# Patient Record
Sex: MALE | Race: WHITE | Employment: FULL TIME | URBAN - METROPOLITAN AREA
[De-identification: names, ages, dates, MRNs, and addresses within clinical notes are randomized per-mention and may not be internally consistent; named-entity substitution may affect disease eponyms.]

---

## 2017-11-27 ENCOUNTER — OFFICE VISIT (OUTPATIENT)
Dept: INTERNAL MEDICINE CLINIC | Age: 32
End: 2017-11-27

## 2017-11-27 VITALS
DIASTOLIC BLOOD PRESSURE: 66 MMHG | HEIGHT: 74 IN | OXYGEN SATURATION: 98 % | BODY MASS INDEX: 26.05 KG/M2 | SYSTOLIC BLOOD PRESSURE: 122 MMHG | WEIGHT: 203 LBS | HEART RATE: 70 BPM

## 2017-11-27 DIAGNOSIS — B36.0 TINEA VERSICOLOR: ICD-10-CM

## 2017-11-27 DIAGNOSIS — Z56.6 WORK STRESS: ICD-10-CM

## 2017-11-27 DIAGNOSIS — Z00.00 ENCOUNTER FOR WELL ADULT EXAM WITHOUT ABNORMAL FINDINGS: Primary | ICD-10-CM

## 2017-11-27 PROCEDURE — 99395 PREV VISIT EST AGE 18-39: CPT | Performed by: INTERNAL MEDICINE

## 2017-11-27 RX ORDER — KETOCONAZOLE 20 MG/G
CREAM TOPICAL 2 TIMES DAILY
Qty: 60 G | Refills: 1 | Status: SHIPPED | OUTPATIENT
Start: 2017-11-27 | End: 2018-11-28

## 2017-11-27 SDOH — HEALTH STABILITY - MENTAL HEALTH: OTHER PHYSICAL AND MENTAL STRAIN RELATED TO WORK: Z56.6

## 2017-11-27 NOTE — PROGRESS NOTES
 Clindamycin/Lincomycin     Ceclor [Cefaclor] Rash        Review of Systems:    Review of Systems   Constitutional: Negative for fatigue and fever. HENT: Negative for ear pain, hearing loss, postnasal drip, rhinorrhea, sinus pressure, sore throat and tinnitus. Eyes: Negative for redness. Respiratory: Negative for cough, chest tightness, shortness of breath and wheezing. Cardiovascular: Negative for chest pain, palpitations and leg swelling. Gastrointestinal: Negative for abdominal pain, constipation, diarrhea, nausea and vomiting. Genitourinary: Negative for dysuria and frequency. Musculoskeletal: Negative for arthralgias, back pain and joint swelling. Skin: Negative for rash. Neurological: Negative for dizziness, syncope and headaches. Psychiatric/Behavioral: Positive for sleep disturbance. The patient is nervous/anxious. Objective:    Vitals:    11/27/17 1127   BP: 122/66   Pulse: 70   SpO2: 98%   Weight: 203 lb (92.1 kg)   Height: 6' 2\" (1.88 m)     Wt Readings from Last 3 Encounters:   11/27/17 203 lb (92.1 kg)   10/26/16 213 lb (96.6 kg)   01/08/16 214 lb (97.1 kg)       Body mass index is 26.06 kg/m². Physical Exam   Constitutional: He appears well-developed and well-nourished. No distress. HENT:   Head: Normocephalic and atraumatic. Right Ear: Hearing, tympanic membrane, external ear and ear canal normal.   Left Ear: Hearing, tympanic membrane, external ear and ear canal normal.   Nose: Nose normal. No mucosal edema or rhinorrhea. Mouth/Throat: Oropharynx is clear and moist and mucous membranes are normal.   Eyes: Pupils are equal, round, and reactive to light. No scleral icterus. Neck: No thyroid mass and no thyromegaly present. Cardiovascular: Normal rate, regular rhythm, S1 normal, S2 normal and normal heart sounds. No murmur heard. Pulmonary/Chest: Effort normal and breath sounds normal. He has no decreased breath sounds. He has no wheezes.  He has no rhonchi. He has no rales. Abdominal: Soft. Normal appearance and bowel sounds are normal. There is no tenderness. Lymphadenopathy:     He has no cervical adenopathy. Neurological: He is alert. No cranial nerve deficit or sensory deficit. Gait normal.   Skin: Skin is warm and dry. Rash (Hyperpigmented annular patches on upper chest) noted. Assessment and Plan    1. Encounter for well adult exam without abnormal findings  Patient is up-to-date on well care. Declined vaccines however. 2. Work stress  Reviewed ways to relieve stress including deep breathing exercises, exercise, getting adequate sleep. If stress becomes overwhelming he should return to discuss other treatment options    3. Tinea versicolor    - ketoconazole (NIZORAL) 2 % cream; Apply topically 2 times daily  Dispense: 60 g; Refill: 1     Patient Instructions   Works stress  Start exercising. The goal is 150 minutes a week  Do deep breathing exercises at least once a day  Consider journaling    Tinea versicolor  Apply ketoconazole cream twice a day for 2-3 weeks         Return in about 1 year (around 11/27/2018) for Return sooner if stress becomes difficult to manage.        Kelsey Medina

## 2017-11-28 ASSESSMENT — ENCOUNTER SYMPTOMS
DIARRHEA: 0
CHEST TIGHTNESS: 0
VOMITING: 0
SORE THROAT: 0
SINUS PRESSURE: 0
EYE REDNESS: 0
NAUSEA: 0
SHORTNESS OF BREATH: 0
ABDOMINAL PAIN: 0
WHEEZING: 0
COUGH: 0
BACK PAIN: 0
CONSTIPATION: 0
RHINORRHEA: 0

## 2018-04-02 ENCOUNTER — TELEPHONE (OUTPATIENT)
Dept: INTERNAL MEDICINE CLINIC | Age: 33
End: 2018-04-02

## 2018-04-16 ENCOUNTER — OFFICE VISIT (OUTPATIENT)
Dept: INTERNAL MEDICINE CLINIC | Age: 33
End: 2018-04-16

## 2018-04-16 VITALS
HEART RATE: 69 BPM | OXYGEN SATURATION: 98 % | BODY MASS INDEX: 25.37 KG/M2 | SYSTOLIC BLOOD PRESSURE: 126 MMHG | WEIGHT: 203 LBS | DIASTOLIC BLOOD PRESSURE: 74 MMHG

## 2018-04-16 DIAGNOSIS — R13.12 OROPHARYNGEAL DYSPHAGIA: Primary | ICD-10-CM

## 2018-04-16 PROCEDURE — 99214 OFFICE O/P EST MOD 30 MIN: CPT | Performed by: INTERNAL MEDICINE

## 2018-04-18 ASSESSMENT — ENCOUNTER SYMPTOMS
EYE REDNESS: 0
EYE PAIN: 0
COUGH: 0
CHOKING: 0

## 2018-11-28 ENCOUNTER — OFFICE VISIT (OUTPATIENT)
Dept: INTERNAL MEDICINE CLINIC | Age: 33
End: 2018-11-28
Payer: COMMERCIAL

## 2018-11-28 VITALS
HEIGHT: 74 IN | HEART RATE: 65 BPM | WEIGHT: 211 LBS | OXYGEN SATURATION: 98 % | SYSTOLIC BLOOD PRESSURE: 118 MMHG | DIASTOLIC BLOOD PRESSURE: 74 MMHG | BODY MASS INDEX: 27.08 KG/M2

## 2018-11-28 DIAGNOSIS — Z00.00 WELL ADULT EXAM: Primary | ICD-10-CM

## 2018-11-28 DIAGNOSIS — G47.30 OBSERVED SLEEP APNEA: ICD-10-CM

## 2018-11-28 DIAGNOSIS — E78.6 LOW HDL (UNDER 40): ICD-10-CM

## 2018-11-28 PROCEDURE — 99395 PREV VISIT EST AGE 18-39: CPT | Performed by: INTERNAL MEDICINE

## 2018-11-28 RX ORDER — CHLORAL HYDRATE 500 MG
3000 CAPSULE ORAL 2 TIMES DAILY
Qty: 60 CAPSULE | Refills: 5 | Status: SHIPPED | OUTPATIENT
Start: 2018-11-28 | End: 2020-08-12

## 2018-11-28 ASSESSMENT — ENCOUNTER SYMPTOMS
FACIAL SWELLING: 0
COUGH: 0
CHOKING: 0
EYE REDNESS: 0
EYE PAIN: 0

## 2018-11-28 NOTE — PATIENT INSTRUCTIONS
information. Patient Education        Therapeutic Ball: Back Exercises  Your Care Instructions  Here are some examples of typical exercises for your condition. Start each exercise slowly. Ease off the exercise if you start to have pain. Your doctor or physical therapist will tell you when you can start these exercises and which ones will work best for you. To prepare, make sure that your ball is the right size for you. When inflated and firm, it should allow you to sit with your hips and knees bent at about a 90-degree angle (like the letter L). How to do the exercises  Seated position on ball    5. Use this exercise to get used to moving on the ball and to find your best sitting position. 6. Sit comfortably on the ball with your feet about hip-width apart. If you feel unsteady, rest your hands on the ball near your hips. 7. As you do this exercise, try to keep your shoulders and upper body relaxed and still. 8. Using your stomach and back muscles to move your pelvis, roll the ball forward. This will round your back. 9. Still using your stomach and back muscles, roll the ball back. You will arch your back. 10. Repeat this rounding-arching motion a few times. 11. Stop in between the two positions, where your back is not rounded or arched. This is called your neutral position. Pelvic rotation    8. Sit tall on the ball. 9. Slowly rotate your hips in a [place] pattern. Keep the movement focused at your hips. 10. Repeat, but [place] in the other direction. 11. Repeat 8 to 12 times. Postural sitting    7. Use this position to find a stable, relaxed posture on the ball. You can use this position as your starting point for other ball exercises. If you feel unsteady on the ball, start on a chair first.  8. Sit on a ball or chair, with your feet planted straight in front of you. 9. Imagine that a string at the top of your head is pulling you straight up.  Think of yourself as 2 inches taller than you straight line. 11. Try to keep the ball steady. Hold for about 6 seconds as you continue to breathe normally. 12. Slowly lower your hips back down to the floor. 13. Repeat 8 to 12 times. Ball curls with bridge    5. Start flat on your back with your ankles resting on the ball. 6. Look up at the ceiling, and keep your chin relaxed. You can place a small pillow under your head or neck for comfort. 7. With your arms by your side, press your hands onto the floor for stability. 8. Tighten your belly muscles by pulling in your belly button toward your spine. 9. Push your heels down toward the floor, squeeze your buttocks, and lift your hips off the floor until your shoulders, hips, and knees are all in a straight line. 10. While holding the bridge position, roll the ball toward you with your heels. Keep your hips as level as you can. 11. Pause briefly, and then roll the ball back out. Try to keep the ball rolling straight. You will feel the muscles in your lower belly working as you straighten your legs. 12. Lower your hips, and return to your starting position. 13. Repeat 8 to 12 times. 14. When you can keep your body and the ball steady throughout this exercise, you're ready for more challenge. Try keeping your hips raised while rolling the ball out, holding the bridge, and rolling back, a few times in a row. Praying jenae    6. Kneel upright with the ball in front of you. 7. To start, clasp your hands together. Rest them on the ball in front of you. 8. As you do this exercise, keep your back and hips straight and tighten your belly and buttocks muscles. Keep your knees in place. 9. Press on the ball with your arms. Lean forward from the knees. This rolls the ball forward. You will bear most of your weight on your arms. 10. If your back starts to ache, you've gone too far. Pull back a bit. 11. Roll back to the start position. 12. Repeat 8 to 12 times. Walk-out plank on ball    1.  Kneel over

## 2018-11-28 NOTE — PROGRESS NOTES
Administered Date(s) Administered    Tdap (Boostrix, Adacel) 07/01/2011       Health Maintenance   Topic Date Due    Flu vaccine (1) 12/06/2019 (Originally 9/1/2018)    HIV screen  12/23/2022 (Originally 5/9/2000)    DTaP/Tdap/Td vaccine (2 - Td) 07/01/2021       ASSESSMENT/PLAN:  1. Well adult exam  -continue to exercise  -  Continue to wear seat belt when in the car  -      2. Low HDL (under 40)  worsening  - Omega-3 Fatty Acids (FISH OIL) 1000 MG CAPS; Take 3 capsules by mouth 2 times daily  Dispense: 60 capsule; Refill: 5    3. Observed sleep apnea  new  - Cara Baxter MD, Sleep Medicine, [facility]      Return in about 4 months (around 3/28/2019) for low hdl - fasting. An electronic signature was used to authenticate this note.     --Tarun Beasley MD on 11/29/2018 at 9:28 AM

## 2019-08-01 ENCOUNTER — OFFICE VISIT (OUTPATIENT)
Dept: INTERNAL MEDICINE CLINIC | Age: 34
End: 2019-08-01
Payer: COMMERCIAL

## 2019-08-01 VITALS
OXYGEN SATURATION: 98 % | DIASTOLIC BLOOD PRESSURE: 70 MMHG | WEIGHT: 216 LBS | HEART RATE: 85 BPM | SYSTOLIC BLOOD PRESSURE: 128 MMHG | TEMPERATURE: 98.4 F | BODY MASS INDEX: 27.73 KG/M2

## 2019-08-01 DIAGNOSIS — A09 TRAVELER'S DIARRHEA: Primary | ICD-10-CM

## 2019-08-01 PROCEDURE — 99213 OFFICE O/P EST LOW 20 MIN: CPT | Performed by: INTERNAL MEDICINE

## 2019-08-01 RX ORDER — AZITHROMYCIN 500 MG/1
500 TABLET, FILM COATED ORAL DAILY
Qty: 3 TABLET | Refills: 0 | Status: SHIPPED | OUTPATIENT
Start: 2019-08-01 | End: 2019-08-04

## 2019-08-01 ASSESSMENT — PATIENT HEALTH QUESTIONNAIRE - PHQ9
SUM OF ALL RESPONSES TO PHQ9 QUESTIONS 1 & 2: 0
SUM OF ALL RESPONSES TO PHQ QUESTIONS 1-9: 0
2. FEELING DOWN, DEPRESSED OR HOPELESS: 0
SUM OF ALL RESPONSES TO PHQ QUESTIONS 1-9: 0
1. LITTLE INTEREST OR PLEASURE IN DOING THINGS: 0

## 2019-10-17 ENCOUNTER — OFFICE VISIT (OUTPATIENT)
Dept: INTERNAL MEDICINE CLINIC | Age: 34
End: 2019-10-17
Payer: COMMERCIAL

## 2019-10-17 VITALS
WEIGHT: 215 LBS | DIASTOLIC BLOOD PRESSURE: 70 MMHG | BODY MASS INDEX: 27.59 KG/M2 | SYSTOLIC BLOOD PRESSURE: 120 MMHG | HEIGHT: 74 IN | HEART RATE: 60 BPM | OXYGEN SATURATION: 97 %

## 2019-10-17 DIAGNOSIS — Z23 NEED FOR INFLUENZA VACCINATION: ICD-10-CM

## 2019-10-17 DIAGNOSIS — Z00.00 WELL ADULT EXAM: Primary | ICD-10-CM

## 2019-10-17 DIAGNOSIS — L29.0 ANAL ITCHING: ICD-10-CM

## 2019-10-17 PROCEDURE — 99395 PREV VISIT EST AGE 18-39: CPT | Performed by: INTERNAL MEDICINE

## 2019-10-17 PROCEDURE — 90686 IIV4 VACC NO PRSV 0.5 ML IM: CPT | Performed by: INTERNAL MEDICINE

## 2019-10-17 PROCEDURE — 90471 IMMUNIZATION ADMIN: CPT | Performed by: INTERNAL MEDICINE

## 2019-10-17 ASSESSMENT — ENCOUNTER SYMPTOMS
ALLERGIC/IMMUNOLOGIC NEGATIVE: 1
GASTROINTESTINAL NEGATIVE: 1
RESPIRATORY NEGATIVE: 1
EYES NEGATIVE: 1

## 2019-10-17 ASSESSMENT — SLEEP AND FATIGUE QUESTIONNAIRES
HOW LIKELY ARE YOU TO NOD OFF OR FALL ASLEEP WHILE SITTING QUIETLY AFTER LUNCH WITHOUT ALCOHOL: 0
HOW LIKELY ARE YOU TO NOD OFF OR FALL ASLEEP IN A CAR, WHILE STOPPED FOR A FEW MINUTES IN TRAFFIC: 0
HOW LIKELY ARE YOU TO NOD OFF OR FALL ASLEEP WHILE SITTING AND READING: 1
ESS TOTAL SCORE: 3
HOW LIKELY ARE YOU TO NOD OFF OR FALL ASLEEP WHEN YOU ARE A PASSENGER IN A CAR FOR AN HOUR WITHOUT A BREAK: 0
HOW LIKELY ARE YOU TO NOD OFF OR FALL ASLEEP WHILE SITTING INACTIVE IN A PUBLIC PLACE: 0
HOW LIKELY ARE YOU TO NOD OFF OR FALL ASLEEP WHILE LYING DOWN TO REST IN THE AFTERNOON WHEN CIRCUMSTANCES PERMIT: 2
HOW LIKELY ARE YOU TO NOD OFF OR FALL ASLEEP WHILE SITTING AND TALKING TO SOMEONE: 0
HOW LIKELY ARE YOU TO NOD OFF OR FALL ASLEEP WHILE WATCHING TV: 0

## 2019-10-22 LAB
ALBUMIN SERPL-MCNC: 4.8 G/DL
ALP BLD-CCNC: 63 U/L
ALT SERPL-CCNC: 27 U/L
ANION GAP SERPL CALCULATED.3IONS-SCNC: NORMAL MMOL/L
AST SERPL-CCNC: 16 U/L
AVERAGE GLUCOSE: NORMAL
BASOPHILS ABSOLUTE: 0 /ΜL
BASOPHILS RELATIVE PERCENT: NORMAL
BILIRUB SERPL-MCNC: 0.5 MG/DL (ref 0.1–1.4)
BUN BLDV-MCNC: 14 MG/DL
CALCIUM SERPL-MCNC: 3.7 MG/DL
CHLORIDE BLD-SCNC: 102 MMOL/L
CHOLESTEROL, TOTAL: 176 MG/DL
CHOLESTEROL, TOTAL: 176 MG/DL
CHOLESTEROL/HDL RATIO: 4.5
CO2: NORMAL
CREAT SERPL-MCNC: 1.12 MG/DL
EOSINOPHILS ABSOLUTE: 9 /ΜL
EOSINOPHILS RELATIVE PERCENT: NORMAL
GFR CALCULATED: 85
GLUCOSE BLD-MCNC: 95 MG/DL
HBA1C MFR BLD: 5.3 %
HCT VFR BLD CALC: 47.6 % (ref 41–53)
HDLC SERPL-MCNC: 39 MG/DL (ref 35–70)
HEMOGLOBIN: 15.9 G/DL (ref 13.5–17.5)
LDL CHOLESTEROL CALCULATED: 115 MG/DL (ref 0–160)
LYMPHOCYTES ABSOLUTE: 36 /ΜL
LYMPHOCYTES RELATIVE PERCENT: NORMAL
MCH RBC QN AUTO: 29.4 PG
MCHC RBC AUTO-ENTMCNC: 33.4 G/DL
MCV RBC AUTO: 88 FL
MONOCYTES ABSOLUTE: 9 /ΜL
MONOCYTES RELATIVE PERCENT: NORMAL
NEUTROPHILS ABSOLUTE: 45 /ΜL
NEUTROPHILS RELATIVE PERCENT: NORMAL
PDW BLD-RTO: NORMAL %
PLATELET # BLD: 285 K/ΜL
PMV BLD AUTO: NORMAL FL
POTASSIUM SERPL-SCNC: 4.5 MMOL/L
RBC # BLD: 5.41 10^6/ΜL
SODIUM BLD-SCNC: 142 MMOL/L
TOTAL PROTEIN: 7.1
TRIGL SERPL-MCNC: 112 MG/DL
VLDLC SERPL CALC-MCNC: 22 MG/DL
WBC # BLD: 5.3 10^3/ML

## 2019-10-30 ENCOUNTER — PATIENT MESSAGE (OUTPATIENT)
Dept: INTERNAL MEDICINE CLINIC | Age: 34
End: 2019-10-30

## 2020-03-31 ENCOUNTER — OFFICE VISIT (OUTPATIENT)
Dept: INTERNAL MEDICINE CLINIC | Age: 35
End: 2020-03-31
Payer: COMMERCIAL

## 2020-03-31 VITALS
SYSTOLIC BLOOD PRESSURE: 114 MMHG | DIASTOLIC BLOOD PRESSURE: 70 MMHG | OXYGEN SATURATION: 98 % | BODY MASS INDEX: 27.6 KG/M2 | WEIGHT: 215 LBS | HEART RATE: 74 BPM

## 2020-03-31 PROCEDURE — 99213 OFFICE O/P EST LOW 20 MIN: CPT | Performed by: INTERNAL MEDICINE

## 2020-03-31 RX ORDER — IBUPROFEN 800 MG/1
800 TABLET ORAL EVERY 8 HOURS PRN
Qty: 90 TABLET | Refills: 2 | Status: SHIPPED | OUTPATIENT
Start: 2020-03-31 | End: 2021-08-29 | Stop reason: ALTCHOICE

## 2020-03-31 ASSESSMENT — PATIENT HEALTH QUESTIONNAIRE - PHQ9
SUM OF ALL RESPONSES TO PHQ QUESTIONS 1-9: 0
SUM OF ALL RESPONSES TO PHQ9 QUESTIONS 1 & 2: 0
2. FEELING DOWN, DEPRESSED OR HOPELESS: 0
SUM OF ALL RESPONSES TO PHQ QUESTIONS 1-9: 0
1. LITTLE INTEREST OR PLEASURE IN DOING THINGS: 0

## 2020-03-31 NOTE — PROGRESS NOTES
[address]   Phone: [phone]           Patient Name: Sixto Dubon    YOB: 1985    Today's Date: 3/31/20           Chief Complaint   Patient presents with    Leg Pain     right side/ whole leg/ not a constant pain          Subjective:  Two weeks of right leg pain  Started to work outside/painting  Has not gone away  Hurts in isolated spots on his hip, calf, knee  Itchy/warm/dull  He has a mole on his leg for years  Sometimes has low back pain. Years ago he saw a chiropractor and was told he had a slight curve. Has not tried any medicines       History:     Past Medical History:   Diagnosis Date    Cyst     left side     Hand injury     left thumb tend rupture    Hernia     age 1       Current Outpatient Medications on File Prior to Visit   Medication Sig Dispense Refill    Probiotic Product (PROBIOTIC PO) Take by mouth      Omega-3 Fatty Acids (FISH OIL) 1000 MG CAPS Take 3 capsules by mouth 2 times daily 60 capsule 5     No current facility-administered medications on file prior to visit. Social History     Tobacco Use    Smoking status: Never Smoker    Smokeless tobacco: Never Used   Substance Use Topics    Alcohol use: Yes     Comment: joan         Review of Systems:    Review of Systems    Objective:    Vitals:    03/31/20 1227   BP: 114/70   Pulse: 74   SpO2: 98%   Weight: 215 lb (97.5 kg)     Wt Readings from Last 3 Encounters:   03/31/20 215 lb (97.5 kg)   10/17/19 215 lb (97.5 kg)   08/01/19 216 lb (98 kg)       Body mass index is 27.6 kg/m². Physical Exam  Constitutional:       Appearance: Normal appearance. He is not ill-appearing. Musculoskeletal:      Right hip: Normal.      Left hip: Normal.      Cervical back: Normal.      Thoracic back: Normal.      Lumbar back: Normal.      Comments: Negative straight leg   Skin:         Neurological:      Mental Status: He is alert. Psychiatric:         Mood and Affect: Mood normal.         Behavior: Behavior normal.         Thought Content: Thought content normal.         Judgment: Judgment normal.           Assessment:    1. Skin lesion  Skin lesion appears to be a scar, however he has a lot of moles. Recommend dermatology referral.    - External Referral To Dermatology  - ibuprofen (ADVIL;MOTRIN) 800 MG tablet; Take 1 tablet by mouth every 8 hours as needed for Pain  Dispense: 90 tablet; Refill: 2    2. Right leg pain  Exam reassuring. Take ibuprofen as needed   - ibuprofen (ADVIL;MOTRIN) 800 MG tablet; Take 1 tablet by mouth every 8 hours as needed for Pain  Dispense: 90 tablet; Refill: 2        Plan/Patient Instructions:    Patient Instructions   [facility]  [phone]    Take Ibuprofen as needed for pain        Return in about 7 months (around 10/15/2020) for Well Adult Exam .       [address]       Documentation was done using voice recognition dragon software. Every effort was made to ensure accuracy; however, inadvertent, unintentional computerized transcription errors may be present.

## 2020-06-11 ENCOUNTER — VIRTUAL VISIT (OUTPATIENT)
Dept: INTERNAL MEDICINE CLINIC | Age: 35
End: 2020-06-11
Payer: COMMERCIAL

## 2020-06-11 PROCEDURE — 99213 OFFICE O/P EST LOW 20 MIN: CPT | Performed by: INTERNAL MEDICINE

## 2020-06-11 RX ORDER — METHYLPREDNISOLONE 4 MG/1
TABLET ORAL
Qty: 1 KIT | Refills: 0 | Status: SHIPPED | OUTPATIENT
Start: 2020-06-11 | End: 2020-06-17

## 2020-06-11 RX ORDER — METHOCARBAMOL 750 MG/1
750 TABLET, FILM COATED ORAL 3 TIMES DAILY PRN
Qty: 90 TABLET | Refills: 1 | Status: SHIPPED | OUTPATIENT
Start: 2020-06-11 | End: 2020-07-11

## 2020-06-11 ASSESSMENT — ENCOUNTER SYMPTOMS: BACK PAIN: 1

## 2020-06-19 ENCOUNTER — HOSPITAL ENCOUNTER (OUTPATIENT)
Dept: PHYSICAL THERAPY | Age: 35
Setting detail: THERAPIES SERIES
Discharge: HOME | End: 2020-06-19
Payer: COMMERCIAL

## 2020-06-19 PROCEDURE — 97161 PT EVAL LOW COMPLEX 20 MIN: CPT

## 2020-06-19 PROCEDURE — 97140 MANUAL THERAPY 1/> REGIONS: CPT

## 2020-06-19 NOTE — FLOWSHEET NOTE
care, mobility, lifting and ambulation.  [] (35584) Provided verbal/tactile cueing for activities related to improving balance, coordination, kinesthetic sense, posture, motor skill, proprioception  to assist with core control in self care, mobility, lifting, and ambulation. Therapeutic Activities:    [x] (52785 or 90344) Provided verbal/tactile cueing for activities related to improving balance, coordination, kinesthetic sense, posture, motor skill, proprioception and motor activation to allow for proper function  with self care and ADLs  [] (37213) Provided training and instruction to the patient for proper core and proximal hip recruitment and positioning with ambulation re-education     Home Exercise Program:    [x] (22240) Reviewed/Progressed HEP activities related to strengthening, flexibility, endurance, ROM of core, proximal hip and LE for functional self-care, mobility, lifting and ambulation   [] (24740) Reviewed/Progressed HEP activities related to improving balance, coordination, kinesthetic sense, posture, motor skill, proprioception of core, proximal hip and LE for self care, mobility, lifting, and ambulation      Manual Treatments:  PROM / STM / Oscillations-Mobs:  G-I, II, III, IV (PA's, Inf., Post.)  [] (37508) Provided manual therapy to mobilize proximal hip and LS spine soft tissue/joints for the purpose of modulating pain, promoting relaxation,  increasing ROM, reducing/eliminating soft tissue swelling/inflammation/restriction, improving soft tissue extensibility and allowing for proper ROM for normal function with self care, mobility, lifting and ambulation.      Modalities:       Charges:  Timed Code Treatment Minutes: eval +15   Total Treatment Minutes: 60       [x] EVAL (LOW) 92945 (typically 20 minutes face-to-face)  [] EVAL (MOD) 94174 (typically 30 minutes face-to-face)  [] EVAL (HIGH) 56367 (typically 45 minutes face-to-face)  [] RE-EVAL     [] IO(47031) x     [] Aditya Walker (80777)  [] NMR

## 2020-06-19 NOTE — PLAN OF CARE
being higher risk   TUG score (>12s at risk):     [] Falls education provided, including         ASSESSMENT: s/s low back pain with radiating pain; extension movement preference  Functional Impairments:     [x]Noted lumbar/proximal hip hypomobility   []Noted lumbosacral and/or generalized hypermobility   [x]Decreased Lumbosacral/hip/LE functional ROM   [x]Decreased core/proximal hip strength and neuromuscular control    [x]Decreased LE functional strength    []Abnormal reflexes/sensation/myotomal/dermatomal deficits  []Reduced balance/proprioceptive control    []other:      Functional Activity Limitations (from functional questionnaire and intake)   [x]Reduced ability to tolerate prolonged functional positions   [x]Reduced ability or difficulty with changes of positions or transfers between positions   [x]Reduced ability to maintain good posture and demonstrate good body mechanics with sitting, bending, and lifting   []Reduced ability to sleep   [] Reduced ability or tolerance with driving and/or computer work   []Reduced ability to perform lifting, reaching, carrying tasks   [x]Reduced ability to squat   [x]Reduced ability to forward bend   []Reduced ability to ambulate prolonged functional periods/distances/surfaces   []Reduced ability to ascend/descend stairs   []other:       Participation Restrictions   [x]Reduced participation in self care activities   [x]Reduced participation in home management activities   [x]Reduced participation in work activities   [x]Reduced participation in social activities. [x]Reduced participation in sport/recreation activities. Classification:   []Signs/symptoms consistent with Lumbar instability/stabilization subgroup. []Signs/symptoms consistent with Lumbar mobilization/manipulation subgroup, myotomes and dermatomes intact. Meets manipulation criteria.     [x]Signs/symptoms consistent with Lumbar direction specific/centralization subgroup   []Signs/symptoms consistent with Lumbar traction subgroup     []Signs/symptoms consistent with lumbar facet dysfunction   []Signs/symptoms consistent with lumbar stenosis type dysfunction   []Signs/symptoms consistent with nerve root involvement including myotome & dermatome dysfunction   []Signs/symptoms consistent with post-surgical status including: decreased ROM, strength and function. [x]signs/symptoms consistent with pathology which may benefit from Dry needling     []other:      Prognosis/Rehab Potential:      [x]Excellent   []Good    []Fair   []Poor    Tolerance of evaluation/treatment:    [x]Excellent   []Good    []Fair   []Poor     Physical Therapy Evaluation Complexity Justification  [x] A history of present problem with:  [x] no personal factors and/or comorbidities that impact the plan of care;  []1-2 personal factors and/or comorbidities that impact the plan of care  []3 personal factors and/or comorbidities that impact the plan of care  [x] An examination of body systems using standardized tests and measures addressing any of the following: body structures and functions (impairments), activity limitations, and/or participation restrictions;:  [x] a total of 1-2 or more elements   [] a total of 3 or more elements   [] a total of 4 or more elements   [x] A clinical presentation with:  [x] stable and/or uncomplicated characteristics   [] evolving clinical presentation with changing characteristics  [] unstable and unpredictable characteristics;   [x] Clinical decision making of [x] low, [] moderate, [] high complexity using standardized patient assessment instrument and/or measurable assessment of functional outcome.     [x] EVAL (LOW) 82962 (typically 30 minutes face-to-face)  [] EVAL (MOD) 76100 (typically 30 minutes face-to-face)  [] EVAL (HIGH) 01159 (typically 45 minutes face-to-face)  [] RE-EVAL     PLAN: Begin PT focusing on: proximal hip mobilizations, LB mobs, LB core activation, proximal hip activation, and HEP

## 2020-06-23 ENCOUNTER — HOSPITAL ENCOUNTER (OUTPATIENT)
Dept: PHYSICAL THERAPY | Age: 35
Setting detail: THERAPIES SERIES
Discharge: HOME | End: 2020-06-23
Payer: COMMERCIAL

## 2020-06-23 PROCEDURE — 97140 MANUAL THERAPY 1/> REGIONS: CPT

## 2020-06-23 PROCEDURE — 97110 THERAPEUTIC EXERCISES: CPT

## 2020-06-25 ENCOUNTER — HOSPITAL ENCOUNTER (OUTPATIENT)
Dept: PHYSICAL THERAPY | Age: 35
Setting detail: THERAPIES SERIES
Discharge: HOME | End: 2020-06-25
Payer: COMMERCIAL

## 2020-06-25 PROCEDURE — 97110 THERAPEUTIC EXERCISES: CPT

## 2020-06-25 PROCEDURE — 97140 MANUAL THERAPY 1/> REGIONS: CPT

## 2020-06-25 PROCEDURE — 20560 NDL INSJ W/O NJX 1 OR 2 MUSC: CPT

## 2020-06-25 NOTE — FLOWSHEET NOTE
[facility]    Physical Therapy Treatment Note/ Progress Report:     Date:  2020    Patient Name:  Costa Kelly    :  1985  MRN: [id number]  Medical/Treatment Diagnosis Information:  · Diagnosis: R sided sciatica M54.31  · Treatment Diagnosis: Low back pain  Insurance/Certification information:  PT Insurance Information: BCBS, $5000 deductible, no copay, 80/20 coinsurance, 20 OP visits  Physician Information:  Referring Practitioner: Luigi Gallegos MD  Plan of care signed (Y/N): y    Date of Patient follow up with Physician:      Progress Report: [x]  Yes  []  No     Functional Scale:  20 ABBEY = 14% disability    Date Range for reporting period:  Beginnin20  Ending:      Progress report due (10 Rx/or 30 days whichever is less):      Recertification due (POC duration/ or 90 days whichever is less):      Visit # Insurance Allowable Auth Needed   3 20  (high deductible) [x]Yes    []No     Pain level: 0/10 currently     SUBJECTIVE:  No pain currently. Leg and back have felt better since the last visit. Hasn't had to squat as much at home and is spending less time in the recliner. OBJECTIVE:    Observation:    Test measurements:   (+) SLR (R) (very early neural tension at approx 30 deg pre manual,  Tension felt at 60 deg post manual)     Noted tissue restrictions/trigger points noted along hamstring musculature adjacent to sciatic nerve path    20: painfree lumbar flexion and sidebending, central pain with extension.       RESTRICTIONS/PRECAUTIONS: R lumbar radic, ext movement preference  (+) neural tension    Exercises/Interventions:     Therapeutic Ex x13' Wt / Resistance sets/sec reps notes   Prone on elbows  2 5    Sciatic nerve sliders  2 10 supine          Supine 90/90 hamstring stretch With towel 30\" 3 Cueing to gentle stretch only, no pain/LE symptoms   Quad rocking  2 10 To introduce hip hinge

## 2020-07-01 ENCOUNTER — HOSPITAL ENCOUNTER (OUTPATIENT)
Dept: PHYSICAL THERAPY | Age: 35
Setting detail: THERAPIES SERIES
Discharge: HOME | End: 2020-07-01
Payer: COMMERCIAL

## 2020-07-01 PROCEDURE — 20560 NDL INSJ W/O NJX 1 OR 2 MUSC: CPT

## 2020-07-01 PROCEDURE — 97140 MANUAL THERAPY 1/> REGIONS: CPT

## 2020-07-01 PROCEDURE — 97110 THERAPEUTIC EXERCISES: CPT

## 2020-07-01 NOTE — FLOWSHEET NOTE
[facility]    Physical Therapy Treatment Note/ Progress Report:     Date:  2020    Patient Name:  Brittny Berman    :  1985  MRN: [id number]  Medical/Treatment Diagnosis Information:  · Diagnosis: R sided sciatica M54.31  · Treatment Diagnosis: Low back pain  Insurance/Certification information:  PT Insurance Information: BCBS, $5000 deductible, no copay, 80/20 coinsurance, 20 OP visits  Physician Information:  Referring Practitioner: Tom Jarquin MD  Plan of care signed (Y/N): y    Date of Patient follow up with Physician:      Progress Report: [x]  Yes  []  No     Functional Scale:  20 ABBEY = 14% disability    Date Range for reporting period:  Beginnin20  Ending:      Progress report due (10 Rx/or 30 days whichever is less):      Recertification due (POC duration/ or 90 days whichever is less):      Visit # Insurance Allowable Auth Needed   4 20  (high deductible) [x]Yes    []No     Pain level: 2/10 currently, right lateral hip     SUBJECTIVE:  Increased pain  from sitting around because of the rainy weather. OBJECTIVE:    Observation:    Test measurements:   (+) SLR (R) (very early neural tension at approx 30 deg pre manual,  Tension felt at 60 deg post manual)   Noted tissue restrictions/trigger points noted along hamstring musculature adjacent to sciatic nerve path  20: painfree lumbar flexion and sidebending, central pain with extension.   2020: left lateral shift      RESTRICTIONS/PRECAUTIONS: R lumbar radic, ext movement preference  (+) neural tension    Exercises/Interventions:     Therapeutic Ex  Wt / Resistance sets/sec reps notes   Prone on elbows  2 5    Sciatic nerve sliders  2 10 supine          Supine 90/90 hamstring stretch With towel 30\" 3 Cueing to gentle stretch only, no pain/LE symptoms   Quad rocking  2 10 To introduce hip hinge mechanics   Front plank on knees  30\" 3x R side glides  2 10                                                                   Manual Intervention 18'              Prone PA  8 min  Gr II, CHRISTIANO and CPA glides   GISTM/STM  Hamstring musculature (R) 10'    Manual neural glide 90/90 8 min     SI Manip       Hip belt mobs       Hip LA distraction (R) 5'  Stopped due to report of mild tingling in (R) LE   Attended stim  5 min     Dry needling  5 min            NMR re-education        Prone hip ext multifidus  2 10x alternating   hooklying alphabet, 1 foot in tabletop Red ball 2x  Cues for TA bracing                          Therapeutic Exercise and NMR EXR  [x] (44753) Provided verbal/tactile cueing for activities related to strengthening, flexibility, endurance, ROM  for improvements in proximal hip and core control with self care, mobility, lifting and ambulation.  [] (70755) Provided verbal/tactile cueing for activities related to improving balance, coordination, kinesthetic sense, posture, motor skill, proprioception  to assist with core control in self care, mobility, lifting, and ambulation.      Therapeutic Activities:    [x] (83606 or 90165) Provided verbal/tactile cueing for activities related to improving balance, coordination, kinesthetic sense, posture, motor skill, proprioception and motor activation to allow for proper function  with self care and ADLs  [] (26064) Provided training and instruction to the patient for proper core and proximal hip recruitment and positioning with ambulation re-education     Home Exercise Program:    [x] (40912) Reviewed/Progressed HEP activities related to strengthening, flexibility, endurance, ROM of core, proximal hip and LE for functional self-care, mobility, lifting and ambulation   [] (48974) Reviewed/Progressed HEP activities related to improving balance, coordination, kinesthetic sense, posture, motor skill, proprioception of core, proximal hip and LE for self care, mobility, lifting, and ambulation Manual Treatments:  PROM / STM / Oscillations-Mobs:  G-I, II, III, IV (PA's, Inf., Post.)  [] (13730) Provided manual therapy to mobilize proximal hip and LS spine soft tissue/joints for the purpose of modulating pain, promoting relaxation,  increasing ROM, reducing/eliminating soft tissue swelling/inflammation/restriction, improving soft tissue extensibility and allowing for proper ROM for normal function with self care, mobility, lifting and ambulation. Spoke with   regarding the use of Dry Needling     Dry needling manual therapy: consisted on the placement of 8 needles in the following muscles:  L3-4 multifidi, sciatic nerve distribution. A 60 mm needle was inserted, piston, rotated, and coned to produce intramuscular mobilization. These techniques were used to restore functional range of motion, reduce muscle spasm and induce healing in the corresponding musculature. (49720)  Clean Technique was utilized today while applying Dry needling treatment. The treatment sites where cleaned with 70% solution of  isopropyl alcohol . The PT washed their hands and utilized treatment gloves along with hand  prior to inserting the needles. All needles where removed and discarded in the appropriate sharps container. MD has given verbal and/or written approval for this treatment. Attended low frequency (1-20Hz) electrical stimulation was utilized in conjunction with Dry Needling:  the Estim was manipulated between all above needles for a period of 5 min. at 3.5 volts. The low frequency electrical stimulation was used to help reduce muscle spasm and help to interrupt /[place] the pain cycle.  (79259)     Modalities:       Charges:  Timed Code Treatment Minutes: 50   Total Treatment Minutes: 50       [] EVAL (LOW) 04835 (typically 20 minutes face-to-face)  [] EVAL (MOD) 27120 (typically 30 minutes face-to-face)  [] EVAL (HIGH) 84571 (typically 45 minutes face-to-face)  [] RE-EVAL     [x] KM(64091) x 2    [] IONTO (44378)  [] NMR (42301) x     [] VASO (94141)  [x] Manual (88306) x   1  [x] Other: dry needling  [] TA (59919)x     [] Mech Traction (20094)  [] ES(attended) (85001)     [] ES (un) (55634):       Goals:   Patient stated goal: tolerate normal activities without pain  []? Progressing: []? Met: []? Not Met: []? Adjusted     Therapist goals for Patient:   Short Term Goals: To be achieved in: 2 weeks  1. Independent in HEP and progression per patient tolerance, in order to prevent re-injury. []? Progressing: []? Met: []? Not Met: []? Adjusted  2. Patient will have a decrease in pain to facilitate improvement in movement, function, and ADLs as indicated by Functional Deficits. []? Progressing: []? Met: []? Not Met: []? Adjusted     Long Term Goals: To be achieved in: 3-4 weeks  1. Disability index score of 0% or less for the ABBEY to assist with reaching prior level of function. []? Progressing: []? Met: []? Not Met: []? Adjusted  2. Patient will demonstrate increased AROM to WNL, good LS mobility, good hip ROM to allow for proper joint functioning as indicated by patients Functional Deficits. []? Progressing: []? Met: []? Not Met: []? Adjusted  3. Patient will demonstrate an increase in Strength to good proximal hip and core activation to allow for proper functional mobility as indicated by patients Functional Deficits. []? Progressing: []? Met: []? Not Met: []? Adjusted  4. Patient will return to ADLs without increased symptoms or restriction. []? Progressing: []? Met: []? Not Met: []? Adjusted  5. Pt will tolerate sitting x 30 min without increased symptoms    []? Progressing: []? Met: []? Not Met: []? Adjusted    Progression Towards Functional goals:  [] Patient is progressing as expected towards functional goals listed. [] Progression is slowed due to complexities listed. [] Progression has been slowed due to co-morbidities.   [x] Plan just implemented, too soon to assess goals progression  [] Other:     ASSESSMENT: lateral shift improved post DN, but then returned after therex. Pt instructed to perform side glides at home if Leg pain returns. Easily fatigued with planks on knees. Treatment/Activity Tolerance:  [x] Patient tolerated treatment well [] Patient limited by fatique  [] Patient limited by pain  [] Patient limited by other medical complications  [] Other:     Overall Progression Towards Functional goals/ Treatment Progress Update:  [] Patient is progressing as expected towards functional goals listed. [] Progression is slowed due to complexities/Impairments listed. [] Progression has been slowed due to co-morbidities. [x] Plan just implemented, too soon to assess goals progression <30days   [] Goals require adjustment due to lack of progress  [] Patient is not progressing as expected and requires additional follow up with physician  [] Other:    Prognosis for POC: [x] Good [] Fair  [] Poor    Patient requires continued skilled intervention: [x] Yes  [] No        PLAN: Re-assess LE symptoms  [x] Continue per plan of care [] Alter current plan (see comments)  [] Plan of care initiated [] Hold pending MD visit [] Discharge    Electronically signed by: Veda Son PT    Note: If patient does not return for scheduled/recommended follow up visits, this note will serve as a discharge from care along with the most recent update on progress.

## 2020-07-07 ENCOUNTER — HOSPITAL ENCOUNTER (OUTPATIENT)
Dept: PHYSICAL THERAPY | Age: 35
Setting detail: THERAPIES SERIES
Discharge: HOME | End: 2020-07-07
Payer: COMMERCIAL

## 2020-07-07 PROCEDURE — 97110 THERAPEUTIC EXERCISES: CPT

## 2020-07-07 PROCEDURE — 97012 MECHANICAL TRACTION THERAPY: CPT

## 2020-07-07 NOTE — FLOWSHEET NOTE
[facility]    Physical Therapy Treatment Note/ Progress Report:     Date:  2020    Patient Name:  Camilo Posey    :  1985  MRN: [id number]  Medical/Treatment Diagnosis Information:  · Diagnosis: R sided sciatica M54.31  · Treatment Diagnosis: Low back pain  Insurance/Certification information:  PT Insurance Information: BCBS, $5000 deductible, no copay, 80/20 coinsurance, 20 OP visits  Physician Information:  Referring Practitioner: Pj Jackson MD  Plan of care signed (Y/N): y    Date of Patient follow up with Physician:      Progress Report: [x]  Yes  []  No     Functional Scale:  20 ABBEY = 14% disability    Date Range for reporting period:  Beginnin20  Ending:      Progress report due (10 Rx/or 30 days whichever is less):      Recertification due (POC duration/ or 90 days whichever is less):      Visit # Insurance Allowable Auth Needed   6 20  (high deductible) [x]Yes    []No     Pain level: 2/10 currently, right lateral hip     SUBJECTIVE:  Pt c/o right lower leg numbness/tingling that started the last 5 days. Tried nerve glides which didn't help. Possibly from standing on a ladder for several hours the day before it started. OBJECTIVE:    Observation:    Test measurements:   (+) SLR (R) (very early neural tension at approx 30 deg pre manual,  Tension felt at 60 deg post manual)   Noted tissue restrictions/trigger points noted along hamstring musculature adjacent to sciatic nerve path  20: painfree lumbar flexion and sidebending, central pain with extension.   2020: left lateral shift      RESTRICTIONS/PRECAUTIONS: R lumbar radic, ext movement preference  (+) neural tension    Exercises/Interventions:     Therapeutic Ex  Wt / Resistance sets/sec reps notes   Prone on elbows  2 5    Sciatic nerve sliders  2 10 supine          Supine 90/90 hamstring stretch With towel 30\" 3 Cueing to gentle stretch only, no pain/LE symptoms   Quad rocking  2 10 To introduce hip hinge mechanics   Front plank on knees  30\" 3x    R side glides  + ext  2 10 Some decrease in symptoms                                                                  Manual Intervention              Prone PA  8 min  Gr II, CHRISTIANO and CPA glides   GISTM/STM  Hamstring musculature (R) 10'    Manual neural glide 90/90 8 min     SI Manip       Hip belt mobs       Hip LA distraction (R) 5'  Stopped due to report of mild tingling in (R) LE   Attended stim  5 min     Dry needling  5 min            NMR re-education        Prone hip ext multifidus  2 10x alternating   hooklying alphabet, 1 foot in tabletop Red ball 2x  Cues for TA bracing                          Therapeutic Exercise and NMR EXR  [x] (07183) Provided verbal/tactile cueing for activities related to strengthening, flexibility, endurance, ROM  for improvements in proximal hip and core control with self care, mobility, lifting and ambulation.  [] (31158) Provided verbal/tactile cueing for activities related to improving balance, coordination, kinesthetic sense, posture, motor skill, proprioception  to assist with core control in self care, mobility, lifting, and ambulation.      Therapeutic Activities:    [x] (87185 or 85481) Provided verbal/tactile cueing for activities related to improving balance, coordination, kinesthetic sense, posture, motor skill, proprioception and motor activation to allow for proper function  with self care and ADLs  [] (44394) Provided training and instruction to the patient for proper core and proximal hip recruitment and positioning with ambulation re-education     Home Exercise Program:    [x] (27580) Reviewed/Progressed HEP activities related to strengthening, flexibility, endurance, ROM of core, proximal hip and LE for functional self-care, mobility, lifting and ambulation   [] (58564) Reviewed/Progressed HEP activities related to improving balance, coordination, kinesthetic sense, posture, motor skill, proprioception of core, proximal hip and LE for self care, mobility, lifting, and ambulation      Manual Treatments:  PROM / STM / Oscillations-Mobs:  G-I, II, III, IV (PA's, Inf., Post.)  [] (56125) Provided manual therapy to mobilize proximal hip and LS spine soft tissue/joints for the purpose of modulating pain, promoting relaxation,  increasing ROM, reducing/eliminating soft tissue swelling/inflammation/restriction, improving soft tissue extensibility and allowing for proper ROM for normal function with self care, mobility, lifting and ambulation. Spoke with   regarding the use of Dry Needling     Dry needling manual therapy: consisted on the placement of 8 needles in the following muscles:  L3-4 multifidi, sciatic nerve distribution. A 60 mm needle was inserted, piston, rotated, and coned to produce intramuscular mobilization. These techniques were used to restore functional range of motion, reduce muscle spasm and induce healing in the corresponding musculature. (84016)  Clean Technique was utilized today while applying Dry needling treatment. The treatment sites where cleaned with 70% solution of  isopropyl alcohol . The PT washed their hands and utilized treatment gloves along with hand  prior to inserting the needles. All needles where removed and discarded in the appropriate sharps container. MD has given verbal and/or written approval for this treatment. Attended low frequency (1-20Hz) electrical stimulation was utilized in conjunction with Dry Needling:  the Estim was manipulated between all above needles for a period of 5 min. at 3.5 volts. The low frequency electrical stimulation was used to help reduce muscle spasm and help to interrupt /[place] the pain cycle.  (13855)     Modalities:   Mechanical traction in prone 75/50lbs x 10 min    Charges:  Timed Code Treatment Minutes: 20   Total Treatment Minutes: 39 [] EVAL (LOW) 68945 (typically 20 minutes face-to-face)  [] EVAL (MOD) 81338 (typically 30 minutes face-to-face)  [] EVAL (HIGH) 90067 (typically 45 minutes face-to-face)  [] RE-EVAL     [x] GL(47335) x 1    [] IONTO (73749)  [] NMR (03340) x     [] VASO (37561)  [] Manual (51924) x     [] Other: dry needling  [] TA (63313)x     [x] Mech Traction (39588)  [] ES(attended) (70905)     [] ES (un) (52841):       Goals:   Patient stated goal: tolerate normal activities without pain  []? Progressing: []? Met: []? Not Met: []? Adjusted     Therapist goals for Patient:   Short Term Goals: To be achieved in: 2 weeks  1. Independent in HEP and progression per patient tolerance, in order to prevent re-injury. []? Progressing: []? Met: []? Not Met: []? Adjusted  2. Patient will have a decrease in pain to facilitate improvement in movement, function, and ADLs as indicated by Functional Deficits. []? Progressing: []? Met: []? Not Met: []? Adjusted     Long Term Goals: To be achieved in: 3-4 weeks  1. Disability index score of 0% or less for the ABBEY to assist with reaching prior level of function. []? Progressing: []? Met: []? Not Met: []? Adjusted  2. Patient will demonstrate increased AROM to WNL, good LS mobility, good hip ROM to allow for proper joint functioning as indicated by patients Functional Deficits. []? Progressing: []? Met: []? Not Met: []? Adjusted  3. Patient will demonstrate an increase in Strength to good proximal hip and core activation to allow for proper functional mobility as indicated by patients Functional Deficits. []? Progressing: []? Met: []? Not Met: []? Adjusted  4. Patient will return to ADLs without increased symptoms or restriction. []? Progressing: []? Met: []? Not Met: []? Adjusted  5. Pt will tolerate sitting x 30 min without increased symptoms    []? Progressing: []? Met: []? Not Met: []?  Adjusted    Progression Towards Functional goals:  [] Patient is progressing as expected towards functional goals listed. [] Progression is slowed due to complexities listed. [] Progression has been slowed due to co-morbidities. [x] Plan just implemented, too soon to assess goals progression  [] Other:     ASSESSMENT: no significant change in symptoms with repeated movement today, but did resolve post mechanical traction. Pt advised to perform prone on elbows if he notices any increased RLE pain next week. Pt was also c/o intermittent right lower abdominal pain that did not seem to change with lumbar movements. Pt referred to PCP for this symptom     Treatment/Activity Tolerance:  [x] Patient tolerated treatment well [] Patient limited by fatique  [] Patient limited by pain  [] Patient limited by other medical complications  [] Other:     Overall Progression Towards Functional goals/ Treatment Progress Update:  [] Patient is progressing as expected towards functional goals listed. [] Progression is slowed due to complexities/Impairments listed. [] Progression has been slowed due to co-morbidities. [x] Plan just implemented, too soon to assess goals progression <30days   [] Goals require adjustment due to lack of progress  [] Patient is not progressing as expected and requires additional follow up with physician  [] Other:    Prognosis for POC: [x] Good [] Fair  [] Poor    Patient requires continued skilled intervention: [x] Yes  [] No        PLAN: Re-assess LE symptoms  [x] Continue per plan of care [] Alter current plan (see comments)  [] Plan of care initiated [] Hold pending MD visit [] Discharge    Electronically signed by: Lazarus Riedel, PT    Note: If patient does not return for scheduled/recommended follow up visits, this note will serve as a discharge from care along with the most recent update on progress.

## 2020-07-09 ENCOUNTER — VIRTUAL VISIT (OUTPATIENT)
Dept: INTERNAL MEDICINE CLINIC | Age: 35
End: 2020-07-09
Payer: COMMERCIAL

## 2020-07-09 PROCEDURE — 99213 OFFICE O/P EST LOW 20 MIN: CPT | Performed by: INTERNAL MEDICINE

## 2020-07-09 ASSESSMENT — ENCOUNTER SYMPTOMS: DIARRHEA: 1

## 2020-07-09 NOTE — PROGRESS NOTES
7/9/2020    TELEHEALTH EVALUATION -- Audio/Visual (During XJKHL-02 public health emergency)    [address]  [address] [facility] [address]  Phone: [phone]           Patient Name: Mckay Thorne    YOB: 1985    Today's Date: 7/9/20     Manuela Chau gave consent to participate in an audio/visual visit    Present at visit: Patient at home alone      Chief Complaint   Patient presents with    Leg Pain     right side          Subjective:  Since Friday, from knee down it feels numb. It is constant. He tried the HEP program provided by PT, no improvement. He has occasional hip pain. He has never had this numbness before     He is also having side pain as well. He is having sharp pain and nausea in the AM. It lasted throughout the night. No fever    Patient reports he does continue to have diarrhea off and on  Ever since he returned from [facility] a year ago. At that time he was treated for traveler's diarrhea. He feels that he just has not felt well ever since that time. History:     Past Medical History:   Diagnosis Date    Cyst     left side     Hand injury     left thumb tend rupture    Hernia     age 1       Current Outpatient Medications on File Prior to Visit   Medication Sig Dispense Refill    methocarbamol (ROBAXIN-750) 750 MG tablet Take 1 tablet by mouth 3 times daily as needed (pain) 90 tablet 1    ibuprofen (ADVIL;MOTRIN) 800 MG tablet Take 1 tablet by mouth every 8 hours as needed for Pain 90 tablet 2    Probiotic Product (PROBIOTIC PO) Take by mouth      Omega-3 Fatty Acids (FISH OIL) 1000 MG CAPS Take 3 capsules by mouth 2 times daily 60 capsule 5     No current facility-administered medications on file prior to visit.         Social History     Tobacco Use    Smoking status: Never Smoker    Smokeless tobacco: Never Used   Substance Use Topics    Alcohol use: Yes     Comment: joan         Review of Systems:    Review of Systems Gastrointestinal: Positive for diarrhea. Musculoskeletal: Positive for gait problem. Neurological: Positive for numbness. All other systems reviewed and are negative. Objective: There were no vitals filed for this visit. Wt Readings from Last 3 Encounters:   03/31/20 215 lb (97.5 kg)   10/17/19 215 lb (97.5 kg)   08/01/19 216 lb (98 kg)       There is no height or weight on file to calculate BMI. Physical Exam  Constitutional:       General: He is not in acute distress. Appearance: Normal appearance. Eyes:      General: No scleral icterus. Right eye: No discharge. Left eye: No discharge. Pulmonary:      Effort: Pulmonary effort is normal. No respiratory distress. Skin:     Coloration: Skin is not jaundiced or pale. Neurological:      General: No focal deficit present. Mental Status: He is alert and oriented to person, place, and time. Psychiatric:         Mood and Affect: Mood normal.         Behavior: Behavior normal.         Thought Content: Thought content normal.         Judgment: Judgment normal.           Assessment/Plan:    1. Right sided sciatica  Symptoms persist.  Currently he has numbness limited to the knee down. Physical therapy has helped with the hip area pain, however the numbness is new. He has not obtained x-ray. Plan will be for him to check the x-ray. If x-ray is unremarkable I will have him see neurology. If there is any sign of degeneration I will refer him to orthospine. 2. Diarrhea of presumed infectious origin  Patient went to [facility] a year ago and returned with diarrhea. He was empirically treated for traveler's diarrhea. He continues to have diarrhea off and on with a sense of being unwell. Recommend checking stool studies below for possible infection  - GI Bacterial Pathogens By PCR; Future  - Fecal Leukocytes;  Future  - OVA & PARASITE ID/COUNT #1; Future  - CBC Auto Differential; Future  - Comprehensive Metabolic Panel; Future  - CRYPTOSPORIDIUM & GIARDIA LAMBLIA ANTIGEN EIA 2; Future  - BLOOD OCCULT STOOL #1; Future        Return if symptoms worsen or fail to improve. Rasheed Oliver       Documentation was done using voice recognition dragon software. Every effort was made to ensure accuracy; however, inadvertent, unintentional computerized transcription errors may be present. Pursuant to the emergency declaration under the [address] [place] [address] wa[facility] authority and the [employer] Act, this Virtual  Visit was conducted, with patient's consent, to reduce the patient's risk of exposure to COVID-19 and provide continuity of care for an established patient. Services were provided through a video synchronous discussion virtually to substitute for in-person clinic visit.

## 2020-07-10 ENCOUNTER — HOSPITAL ENCOUNTER (OUTPATIENT)
Dept: GENERAL RADIOLOGY | Age: 35
Discharge: HOME | End: 2020-07-10
Payer: COMMERCIAL

## 2020-07-10 ENCOUNTER — TELEPHONE (OUTPATIENT)
Dept: INTERNAL MEDICINE CLINIC | Age: 35
End: 2020-07-10

## 2020-07-10 ENCOUNTER — HOSPITAL ENCOUNTER (OUTPATIENT)
Age: 35
Discharge: HOME | End: 2020-07-10
Payer: COMMERCIAL

## 2020-07-10 DIAGNOSIS — R19.7 DIARRHEA OF PRESUMED INFECTIOUS ORIGIN: ICD-10-CM

## 2020-07-10 LAB
A/G RATIO: 1.7 (ref 1.1–2.2)
ALBUMIN SERPL-MCNC: 4.7 G/DL (ref 3.4–5)
ALP BLD-CCNC: 55 U/L (ref 40–129)
ALT SERPL-CCNC: 24 U/L (ref 10–40)
ANION GAP SERPL CALCULATED.3IONS-SCNC: 13 MMOL/L (ref 3–16)
AST SERPL-CCNC: 15 U/L (ref 15–37)
BASOPHILS ABSOLUTE: 0 K/UL (ref 0–0.2)
BASOPHILS RELATIVE PERCENT: 0.5 %
BILIRUB SERPL-MCNC: 0.6 MG/DL (ref 0–1)
BUN BLDV-MCNC: 10 MG/DL (ref 7–20)
CALCIUM SERPL-MCNC: 9.3 MG/DL (ref 8.3–10.6)
CHLORIDE BLD-SCNC: 103 MMOL/L (ref 99–110)
CO2: 24 MMOL/L (ref 21–32)
CREAT SERPL-MCNC: 0.8 MG/DL (ref 0.9–1.3)
EOSINOPHILS ABSOLUTE: 0.4 K/UL (ref 0–0.6)
EOSINOPHILS RELATIVE PERCENT: 6.7 %
GFR AFRICAN AMERICAN: >60
GFR NON-AFRICAN AMERICAN: >60
GLOBULIN: 2.7 G/DL
GLUCOSE BLD-MCNC: 99 MG/DL (ref 70–99)
HCT VFR BLD CALC: 50.4 % (ref 40.5–52.5)
HEMOGLOBIN: 16.9 G/DL (ref 13.5–17.5)
LYMPHOCYTES ABSOLUTE: 2.1 K/UL (ref 1–5.1)
LYMPHOCYTES RELATIVE PERCENT: 35.6 %
MCH RBC QN AUTO: 29.4 PG (ref 26–34)
MCHC RBC AUTO-ENTMCNC: 33.6 G/DL (ref 31–36)
MCV RBC AUTO: 87.6 FL (ref 80–100)
MONOCYTES ABSOLUTE: 0.4 K/UL (ref 0–1.3)
MONOCYTES RELATIVE PERCENT: 6.7 %
NEUTROPHILS ABSOLUTE: 2.9 K/UL (ref 1.7–7.7)
NEUTROPHILS RELATIVE PERCENT: 50.5 %
PDW BLD-RTO: 12.9 % (ref 12.4–15.4)
PLATELET # BLD: 289 K/UL (ref 135–450)
PMV BLD AUTO: 7.8 FL (ref 5–10.5)
POTASSIUM SERPL-SCNC: 4.3 MMOL/L (ref 3.5–5.1)
RBC # BLD: 5.75 M/UL (ref 4.2–5.9)
SODIUM BLD-SCNC: 140 MMOL/L (ref 136–145)
TOTAL PROTEIN: 7.4 G/DL (ref 6.4–8.2)
WBC # BLD: 5.8 K/UL (ref 4–11)

## 2020-07-10 PROCEDURE — 72100 X-RAY EXAM L-S SPINE 2/3 VWS: CPT

## 2020-07-13 DIAGNOSIS — R19.7 DIARRHEA OF PRESUMED INFECTIOUS ORIGIN: ICD-10-CM

## 2020-07-13 LAB
OCCULT BLOOD DIAGNOSTIC: NORMAL
WHITE BLOOD CELLS (WBC), STOOL: NORMAL

## 2020-07-14 ENCOUNTER — APPOINTMENT (OUTPATIENT)
Dept: PHYSICAL THERAPY | Age: 35
End: 2020-07-14
Payer: COMMERCIAL

## 2020-07-14 LAB
CRYPTOSPORIDIUM ANTIGEN, EIA: NORMAL
GI BACTERIAL PATHOGENS BY PCR: NORMAL
GIARDIA ANTIGEN, EIA: NORMAL

## 2020-07-16 LAB — INTERPRETATION: NEGATIVE

## 2020-08-12 ENCOUNTER — OFFICE VISIT (OUTPATIENT)
Dept: INTERNAL MEDICINE CLINIC | Age: 35
End: 2020-08-12
Payer: COMMERCIAL

## 2020-08-12 VITALS
WEIGHT: 219 LBS | DIASTOLIC BLOOD PRESSURE: 68 MMHG | BODY MASS INDEX: 28.12 KG/M2 | SYSTOLIC BLOOD PRESSURE: 104 MMHG | TEMPERATURE: 96.8 F | OXYGEN SATURATION: 98 % | HEART RATE: 59 BPM

## 2020-08-12 PROCEDURE — 99213 OFFICE O/P EST LOW 20 MIN: CPT | Performed by: INTERNAL MEDICINE

## 2020-08-12 ASSESSMENT — ENCOUNTER SYMPTOMS
CHOKING: 0
EYE PAIN: 0
COUGH: 0

## 2020-10-28 LAB
ALBUMIN SERPL-MCNC: 4.6 G/DL
ALP BLD-CCNC: 67 U/L
ALT SERPL-CCNC: 45 U/L
ANION GAP SERPL CALCULATED.3IONS-SCNC: 1.8 MMOL/L
AST SERPL-CCNC: 21 U/L
AVERAGE GLUCOSE: NORMAL
BILIRUB SERPL-MCNC: 0.6 MG/DL (ref 0.1–1.4)
BUN BLDV-MCNC: 11 MG/DL
CALCIUM SERPL-MCNC: 9.2 MG/DL
CHLORIDE BLD-SCNC: 104 MMOL/L
CHOLESTEROL, TOTAL: 186 MG/DL
CHOLESTEROL/HDL RATIO: 5
CO2: NORMAL
CREAT SERPL-MCNC: 1.01 MG/DL
GFR CALCULATED: 96
GLUCOSE BLD-MCNC: 86 MG/DL
HBA1C MFR BLD: 5.1 %
HDLC SERPL-MCNC: 37 MG/DL (ref 35–70)
LDL CHOLESTEROL CALCULATED: 125 MG/DL (ref 0–160)
NONHDLC SERPL-MCNC: NORMAL MG/DL
POTASSIUM SERPL-SCNC: 4.3 MMOL/L
SODIUM BLD-SCNC: 140 MMOL/L
TOTAL PROTEIN: 7.2
TRIGL SERPL-MCNC: 132 MG/DL
VLDLC SERPL CALC-MCNC: NORMAL MG/DL

## 2020-11-04 ENCOUNTER — OFFICE VISIT (OUTPATIENT)
Dept: INTERNAL MEDICINE CLINIC | Age: 35
End: 2020-11-04
Payer: COMMERCIAL

## 2020-11-04 VITALS
DIASTOLIC BLOOD PRESSURE: 76 MMHG | TEMPERATURE: 97.8 F | SYSTOLIC BLOOD PRESSURE: 124 MMHG | HEART RATE: 75 BPM | HEIGHT: 75 IN | OXYGEN SATURATION: 98 % | BODY MASS INDEX: 27.73 KG/M2 | WEIGHT: 223 LBS

## 2020-11-04 PROBLEM — Z00.00 WELL ADULT EXAM: Status: ACTIVE | Noted: 2020-11-04

## 2020-11-04 PROBLEM — S86.892A LEFT MEDIAL TIBIAL STRESS SYNDROME: Status: ACTIVE | Noted: 2020-11-04

## 2020-11-04 PROCEDURE — 99395 PREV VISIT EST AGE 18-39: CPT | Performed by: INTERNAL MEDICINE

## 2020-11-04 ASSESSMENT — PATIENT HEALTH QUESTIONNAIRE - PHQ9
SUM OF ALL RESPONSES TO PHQ9 QUESTIONS 1 & 2: 0
SUM OF ALL RESPONSES TO PHQ QUESTIONS 1-9: 0
SUM OF ALL RESPONSES TO PHQ QUESTIONS 1-9: 0
2. FEELING DOWN, DEPRESSED OR HOPELESS: 0
1. LITTLE INTEREST OR PLEASURE IN DOING THINGS: 0
SUM OF ALL RESPONSES TO PHQ QUESTIONS 1-9: 0

## 2020-11-04 NOTE — PROGRESS NOTES
2020    Cash Garcia (:  1985) is a 28 y.o. male, here for a preventive medicine evaluation. Patient reports  That his leg pain has improved. He notes that walking helps with it. Occasionally, he notes some shin pain when walking at a brisk pace. Patient Active Problem List   Diagnosis    Left medial tibial stress syndrome    Well adult exam       Review of Systems   Constitutional: Negative for chills and diaphoresis. HENT: Negative for drooling and ear discharge. Eyes: Negative for pain and redness. Respiratory: Negative for cough and choking. Endocrine: Negative for polydipsia and polyphagia. Prior to Visit Medications    Medication Sig Taking?  Authorizing Provider   triamcinolone (KENALOG) 0.1 % ointment Apply topically 2 times daily for 7 days Yes Belkis Major MD   MULTIPLE VITAMIN PO Take by mouth Yes Historical Provider, MD   ibuprofen (ADVIL;MOTRIN) 800 MG tablet Take 1 tablet by mouth every 8 hours as needed for Pain Yes Mala Markham MD        Allergies   Allergen Reactions    Clindamycin/Lincomycin     Ceclor [Cefaclor] Rash       Past Medical History:   Diagnosis Date    Cyst     left side     Hand injury     left thumb tend rupture    Hernia     age 3       Past Surgical History:   Procedure Laterality Date    HAND SURGERY      HERNIA REPAIR      OTHER SURGICAL HISTORY  11/16/15    excision of lypoma LEFT chest wall    TONSILLECTOMY         Social History     Socioeconomic History    Marital status:      Spouse name: Not on file    Number of children: 3    Years of education: Not on file    Highest education level: Not on file   Occupational History    Occupation: Manager    Social Needs    Financial resource strain: Not on file    Food insecurity     Worry: Not on file     Inability: Not on file    Transportation needs     Medical: Not on file     Non-medical: Not on file   Tobacco Use    Smoking status: Never Smoker    Smokeless tobacco: Never Used   Substance and Sexual Activity    Alcohol use: Yes     Comment: occais    Drug use: No    Sexual activity: Yes     Partners: Female   Lifestyle    Physical activity     Days per week: Not on file     Minutes per session: Not on file    Stress: Not on file   Relationships    Social connections     Talks on phone: Not on file     Gets together: Not on file     Attends Bahai service: Not on file     Active member of club or organization: Not on file     Attends meetings of clubs or organizations: Not on file     Relationship status: Not on file    Intimate partner violence     Fear of current or ex partner: Not on file     Emotionally abused: Not on file     Physically abused: Not on file     Forced sexual activity: Not on file   Other Topics Concern    Not on file   Social History Narrative    He has two boys and two girls         Family History   Problem Relation Age of Onset    No Known Problems Mother     Other Father         Sleep apnea     No Known Problems Sister     No Known Problems Brother     Breast Cancer Paternal Aunt     Cancer Maternal Grandmother     Cancer Maternal Grandfather     Heart Disease Paternal Grandfather     High Blood Pressure Paternal Grandfather        ADVANCE DIRECTIVE: N, <no information>    Vitals:    11/04/20 1228   BP: 124/76   Site: Left Upper Arm   Position: Sitting   Cuff Size: Large Adult   Pulse: 75   Temp: 97.8 °F (36.6 °C)   TempSrc: Infrared   SpO2: 98%   Weight: 223 lb (101.2 kg)   Height: 6' 2.5\" (1.892 m)     Estimated body mass index is 28.25 kg/m² as calculated from the following:    Height as of this encounter: 6' 2.5\" (1.892 m). Weight as of this encounter: 223 lb (101.2 kg). Physical Exam  HENT:      Head: Normocephalic and atraumatic. Right Ear: Tympanic membrane and ear canal normal.      Left Ear: Tympanic membrane and ear canal normal.      Nose: Nose normal. No congestion or rhinorrhea. Mouth/Throat:      Mouth: Mucous membranes are moist.      Pharynx: No oropharyngeal exudate or posterior oropharyngeal erythema. Eyes:      General:         Right eye: No discharge. Pupils: Pupils are equal, round, and reactive to light. Neck:      Musculoskeletal: Normal range of motion and neck supple. No neck rigidity or muscular tenderness. Cardiovascular:      Rate and Rhythm: Normal rate and regular rhythm. Pulmonary:      Effort: Pulmonary effort is normal. No respiratory distress. Breath sounds: No stridor. No wheezing or rhonchi. Musculoskeletal:        Legs:    Neurological:      Mental Status: He is alert. No flowsheet data found. Lab Results   Component Value Date    CHOL 176 10/22/2019    CHOL 176 10/22/2019    CHOL 171 10/14/2015    TRIG 112 10/22/2019    TRIG 175 10/14/2015    HDL 39 10/22/2019    HDL 39 10/14/2015    LDLCALC 115 10/22/2019    LDLCALC 97 10/14/2015    GLUCOSE 99 07/10/2020    LABA1C 5.3 10/22/2019       The ASCVD Risk score (Ade Em, et al., 2013) failed to calculate for the following reasons: The 2013 ASCVD risk score is only valid for ages 36 to 78    Immunization History   Administered Date(s) Administered    Influenza Virus Vaccine 10/26/2020    Influenza, Quadv, IM, PF (6 mo and older Fluzone, Flulaval, Fluarix, and 3 yrs and older Afluria) 10/17/2019    Tdap (Boostrix, Adacel) 07/01/2011       Health Maintenance   Topic Date Due    Varicella vaccine (1 of 2 - 2-dose childhood series) 05/09/1986    HIV screen  12/23/2022 (Originally 5/9/2000)    DTaP/Tdap/Td vaccine (2 - Td) 07/01/2021    Flu vaccine  Completed    Hepatitis A vaccine  Aged Out    Hepatitis B vaccine  Aged Out    Hib vaccine  Aged Out    Meningococcal (ACWY) vaccine  Aged Out    Pneumococcal 0-64 years Vaccine  Aged Out       ASSESSMENT/PLAN:  1.  Well adult exam  Doing well  -  Patient will email us his wellness labs from work  -  Patient will continue to exercise  -  Reduce carbohydrate intake    2. Left medial tibial stress syndrome, initial encounter  worseing  -  Refer to Estee Wendy Jin in about 1 year (around 11/4/2021) for Annual Physical.    An electronic signature was used to authenticate this note.     --Agustina Cabrera MD on 11/5/2020 at 6:52 AM

## 2020-11-04 NOTE — PATIENT INSTRUCTIONS
Patient Education        Shin Splints: Care Instructions  Your Care Instructions     Shin splints cause pain in the shin, the front part of the lower leg. They can also cause swelling. The pain is most likely from repeated stress on the shinbone (tibia) and the tissue that connects the muscle to the tibia. Shin splints are common in people who run or jog. Activities where you run or jump on hard surfaces, such as basketball or tennis, can also lead to shin splints. They can also be caused by training too hard or running in shoes that are worn out. Follow-up care is a key part of your treatment and safety. Be sure to make and go to all appointments, and call your doctor if you are having problems. It's also a good idea to know your test results and keep a list of the medicines you take. How can you care for yourself at home? · Stop doing the activity that is causing pain, or do less of it, until you feel better. ? Run or exercise only on soft surfaces, such as dirt or grass. ? Run on level ground, and avoid hills. ? Reduce your speed and distance when you run. · If you have pain, prop up the sore leg on a pillow and ice it. Try to keep your leg above the level of your heart. This will help reduce swelling. ? Put ice or a cold pack on the area for 10 to 20 minutes at a time. Try to do this every 1 to 2 hours (when you are awake) or until the swelling goes down. Put a thin cloth between the ice and your skin. · Take an over-the-counter pain medicine, such as acetaminophen (Tylenol), ibuprofen (Advil, Motrin), or naproxen (Aleve). Be safe with medicines. Read and follow all instructions on the label. · If your doctor gave you stretches or exercises to do, do them exactly as directed. · Get a new pair of shoes. Pick shoes with good arch support and a cushioned sole. Or try shoe inserts (orthotics). Use them in both shoes, even if only one leg hurts.   · Don't go back to your old exercise routine too quickly after you feel better. Start slowly. Then, bit by bit, increase how often and how long you work out. If you start out too fast, your pain may come back. When should you call for help? Watch closely for changes in your health, and be sure to contact your doctor if:    · You have new or worse pain in your shin.     · The pain becomes focused in one small area of the shin.     · You are not getting better after 2 weeks. Where can you learn more? Go to https://HumanCloud.Aardvark. org and sign in to your SkyRank account. Enter R772 in the Onlineprinters box to learn more about \"Shin Splints: Care Instructions. \"     If you do not have an account, please click on the \"Sign Up Now\" link. Current as of: March 2, 2020               Content Version: 12.6  © 7055-8489 [employer]. Care instructions adapted under license by [facility] ([facility]). If you have questions about a medical condition or this instruction, always ask your healthcare professional. [place] [address] any warranty or liability for your use of this information. Patient Education        Shin Splints (Shin Pain): Exercises  Introduction  Here are some examples of exercises for you to try. The exercises may be suggested for a condition or for rehabilitation. Start each exercise slowly. Ease off the exercises if you start to have pain. You will be told when to start these exercises and which ones will work best for you. How to do the exercises  Calf wall stretch (back knee straight)   1. Stand facing a wall with your hands on the wall at about eye level. Put your affected leg about a step behind your other leg. 2. Keeping your back leg straight and your back heel on the floor, bend your front knee and gently bring your hip and chest toward the wall until you feel a stretch in the calf of your back leg. 3. Hold the stretch for at least 15 to 30 seconds. 4. Repeat 2 to 4 times.     Calf wall stretch (knees make and go to all appointments, and call your doctor if you are having problems. It's also a good idea to know your test results and keep a list of the medicines you take. Where can you learn more? Go to https://chvidya.Buzz360. org and sign in to your Movable account. Enter L896 in the ClickMagic box to learn more about \"Shin Splints (Shin Pain): Exercises. \"     If you do not have an account, please click on the \"Sign Up Now\" link. Current as of: March 2, 2020               Content Version: 12.6  © 6221-7889 [employer]al [employer]. Care instructions adapted under license by [facility] ([facility]). If you have questions about a medical condition or this instruction, always ask your healthcare professional. [address] any warranty or liability for your use of this information.

## 2020-11-05 ASSESSMENT — ENCOUNTER SYMPTOMS
CHOKING: 0
COUGH: 0
EYE PAIN: 0
EYE REDNESS: 0

## 2020-11-29 ENCOUNTER — PATIENT MESSAGE (OUTPATIENT)
Dept: INTERNAL MEDICINE CLINIC | Age: 35
End: 2020-11-29

## 2020-11-30 NOTE — TELEPHONE ENCOUNTER
From: Severiano Beasleyer  To: Belkis Major MD  Sent: 11/29/2020 6:49 PM EST  Subject: Test Results Question    Dr Turner    Attached are the results from my most recent biometric screening. I cholesterol is what it has been for years. Im trying to exercise and tske fish oils. Is there anything else i should do? I also have high Alt and GGT showing in the second image. Im not sure what those are.  Please let me know if i should schedule a follow up

## 2020-12-04 PROBLEM — Z00.00 WELL ADULT EXAM: Status: RESOLVED | Noted: 2020-11-04 | Resolved: 2020-12-04

## 2021-02-08 ENCOUNTER — PATIENT MESSAGE (OUTPATIENT)
Dept: INTERNAL MEDICINE CLINIC | Age: 36
End: 2021-02-08

## 2021-02-08 DIAGNOSIS — R20.2 RIGHT LEG PARESTHESIAS: Primary | ICD-10-CM

## 2021-02-10 ENCOUNTER — TELEPHONE (OUTPATIENT)
Dept: NEUROLOGY | Age: 36
End: 2021-02-10

## 2021-03-01 ENCOUNTER — PROCEDURE VISIT (OUTPATIENT)
Dept: NEUROLOGY | Age: 36
End: 2021-03-01
Payer: COMMERCIAL

## 2021-03-01 DIAGNOSIS — M79.604 RIGHT LEG PAIN: Primary | ICD-10-CM

## 2021-03-01 PROCEDURE — 95886 MUSC TEST DONE W/N TEST COMP: CPT | Performed by: PSYCHIATRY & NEUROLOGY

## 2021-03-01 PROCEDURE — 95908 NRV CNDJ TST 3-4 STUDIES: CPT | Performed by: PSYCHIATRY & NEUROLOGY

## 2021-03-01 NOTE — PROGRESS NOTES
Brendan Ruano M.D. [facility]) Physicians/[facility]  Board Certified in [address] [facility] [address] [facility], [address]    EMG / NERVE CONDUCTION STUDY    PATIENT:     Cale Ferro      DATE OF EMG:    3/1/2021    YOB: 1985       REASON FOR EMG:   Right leg pain    REFERRING PHYSICIAN:  Wilber Huerta MD  [address] [facility],  [address] [place] [address]    SUMMARY:   The right peroneal and posterior tibial motor nerve studies were normal  The right sural sensory nerve study was normal  Needle EMG of several muscles in the right lower extremity was normal.  Needle EMG of the right lumbar paraspinal muscles was normal.     CLINICAL DIAGNOSIS:    Unspecified neuropathy versus radiculopathy       EMG RESULTS:   This is a normal EMG and nerve conduction study of the right lower extremity. There is no electrophysiological evidence for neuropathy or lumbar radiculopathy in this study. _____________________________  Brendan Ruano M.D.   Electromyographer/Neurologist

## 2021-03-05 ENCOUNTER — TELEPHONE (OUTPATIENT)
Dept: INTERNAL MEDICINE CLINIC | Age: 36
End: 2021-03-05

## 2021-03-05 NOTE — TELEPHONE ENCOUNTER
Pt had a EMG completed. LVM for pt to obtain \"normal\" results. Per Dr Morenita Turner, he would like for pt to schedule an appt to discuss.

## 2021-03-15 ENCOUNTER — TELEPHONE (OUTPATIENT)
Dept: INTERNAL MEDICINE CLINIC | Age: 36
End: 2021-03-15

## 2021-03-15 NOTE — TELEPHONE ENCOUNTER
----- Message from Lizet Banda sent at 3/15/2021  2:11 PM EDT -----  Subject: Results Request    QUESTIONS  Which lab or imaging result is the patient calling about? EMG results  Which provider ordered the test?   At what location was the test performed? Date the test was performed? Additional Information for Provider?   ---------------------------------------------------------------------------  --------------  CALL BACK INFO  What is the best way for the office to contact you? OK to leave message on   voicemail  Preferred Call Back Phone Number?  [phone]

## 2021-03-15 NOTE — TELEPHONE ENCOUNTER
Pt calling to let us know that he received our message about his results being normal. Pt would like to make marbella ppt to discuss what's next. Pt added to the schedule.

## 2021-03-22 ENCOUNTER — OFFICE VISIT (OUTPATIENT)
Dept: INTERNAL MEDICINE CLINIC | Age: 36
End: 2021-03-22
Payer: COMMERCIAL

## 2021-03-22 VITALS
DIASTOLIC BLOOD PRESSURE: 74 MMHG | HEIGHT: 74 IN | TEMPERATURE: 97.5 F | BODY MASS INDEX: 29 KG/M2 | WEIGHT: 226 LBS | SYSTOLIC BLOOD PRESSURE: 124 MMHG | HEART RATE: 67 BPM | OXYGEN SATURATION: 98 %

## 2021-03-22 DIAGNOSIS — R20.2 RIGHT LEG PARESTHESIAS: ICD-10-CM

## 2021-03-22 DIAGNOSIS — M79.10 MYALGIA: Primary | ICD-10-CM

## 2021-03-22 LAB
BASOPHILS ABSOLUTE: 0 K/UL (ref 0–0.2)
BASOPHILS RELATIVE PERCENT: 0.7 %
C-REACTIVE PROTEIN: 4.9 MG/L (ref 0–5.1)
EOSINOPHILS ABSOLUTE: 0.3 K/UL (ref 0–0.6)
EOSINOPHILS RELATIVE PERCENT: 5.6 %
HCT VFR BLD CALC: 44.9 % (ref 40.5–52.5)
HEMOGLOBIN: 15.4 G/DL (ref 13.5–17.5)
LYMPHOCYTES ABSOLUTE: 1.9 K/UL (ref 1–5.1)
LYMPHOCYTES RELATIVE PERCENT: 34.2 %
MCH RBC QN AUTO: 29.5 PG (ref 26–34)
MCHC RBC AUTO-ENTMCNC: 34.4 G/DL (ref 31–36)
MCV RBC AUTO: 85.6 FL (ref 80–100)
MONOCYTES ABSOLUTE: 0.4 K/UL (ref 0–1.3)
MONOCYTES RELATIVE PERCENT: 8.1 %
NEUTROPHILS ABSOLUTE: 2.8 K/UL (ref 1.7–7.7)
NEUTROPHILS RELATIVE PERCENT: 51.4 %
PDW BLD-RTO: 12.8 % (ref 12.4–15.4)
PLATELET # BLD: 274 K/UL (ref 135–450)
PMV BLD AUTO: 7.2 FL (ref 5–10.5)
RBC # BLD: 5.24 M/UL (ref 4.2–5.9)
SEDIMENTATION RATE, ERYTHROCYTE: 5 MM/HR (ref 0–15)
WBC # BLD: 5.4 K/UL (ref 4–11)

## 2021-03-22 PROCEDURE — 99213 OFFICE O/P EST LOW 20 MIN: CPT | Performed by: INTERNAL MEDICINE

## 2021-03-22 SDOH — ECONOMIC STABILITY: TRANSPORTATION INSECURITY
IN THE PAST 12 MONTHS, HAS LACK OF TRANSPORTATION KEPT YOU FROM MEETINGS, WORK, OR FROM GETTING THINGS NEEDED FOR DAILY LIVING?: NOT ASKED

## 2021-03-22 SDOH — ECONOMIC STABILITY: FOOD INSECURITY: WITHIN THE PAST 12 MONTHS, THE FOOD YOU BOUGHT JUST DIDN'T LAST AND YOU DIDN'T HAVE MONEY TO GET MORE.: NOT ASKED

## 2021-03-22 SDOH — ECONOMIC STABILITY: INCOME INSECURITY: HOW HARD IS IT FOR YOU TO PAY FOR THE VERY BASICS LIKE FOOD, HOUSING, MEDICAL CARE, AND HEATING?: NOT HARD AT ALL

## 2021-03-22 SDOH — ECONOMIC STABILITY: FOOD INSECURITY: WITHIN THE PAST 12 MONTHS, YOU WORRIED THAT YOUR FOOD WOULD RUN OUT BEFORE YOU GOT MONEY TO BUY MORE.: NOT ASKED

## 2021-03-22 ASSESSMENT — PATIENT HEALTH QUESTIONNAIRE - PHQ9
SUM OF ALL RESPONSES TO PHQ9 QUESTIONS 1 & 2: 3
5. POOR APPETITE OR OVEREATING: 0
8. MOVING OR SPEAKING SO SLOWLY THAT OTHER PEOPLE COULD HAVE NOTICED. OR THE OPPOSITE, BEING SO FIGETY OR RESTLESS THAT YOU HAVE BEEN MOVING AROUND A LOT MORE THAN USUAL: 0
1. LITTLE INTEREST OR PLEASURE IN DOING THINGS: 1
SUM OF ALL RESPONSES TO PHQ QUESTIONS 1-9: 9
SUM OF ALL RESPONSES TO PHQ QUESTIONS 1-9: 9
6. FEELING BAD ABOUT YOURSELF - OR THAT YOU ARE A FAILURE OR HAVE LET YOURSELF OR YOUR FAMILY DOWN: 0
SUM OF ALL RESPONSES TO PHQ QUESTIONS 1-9: 9

## 2021-03-22 NOTE — PROGRESS NOTES
Hernando Ochoa (:  1985) is a 28 y.o. male,Established patient, here for evaluation of the following chief complaint(s):  Leg Pain (right leg only x 1 year, had EMG that was normal )      ASSESSMENT/PLAN:  1. Myalgia  -     Sedimentation Rate  -     CBC Auto Differential  -     C-Reactive Protein  2. Right leg paresthesias  -     External Referral to Pediatric Physical Medicine and Rehab      No follow-ups on file. SUBJECTIVE/OBJECTIVE:  HPI patient comes in for left leg pain. He had an emg done that was reassuring but did not  Identify any lesions in peripheral nerves or spinal cord. He continues to have pain. He would like  To see a specialist for this. Review of Systems   Constitutional: Negative for diaphoresis and fatigue. Gastrointestinal: Negative for anal bleeding and blood in stool. Genitourinary: Negative for testicular pain. Musculoskeletal: Positive for arthralgias. Vitals:    21 1059   BP: 124/74   Pulse: 67   Temp: 97.5 °F (36.4 °C)   TempSrc: Temporal   SpO2: 98%   Weight: 226 lb (102.5 kg)   Height: 6' 2\" (1.88 m)      Wt Readings from Last 3 Encounters:   21 226 lb (102.5 kg)   20 223 lb (101.2 kg)   20 219 lb (99.3 kg)     BP Readings from Last 3 Encounters:   21 124/74   20 124/76   20 104/68     Body mass index is 29.02 kg/m². Facility age limit for growth percentiles is 20 years. Physical Exam  Constitutional:       Appearance: Normal appearance. HENT:      Head: Normocephalic and atraumatic. Right Ear: Tympanic membrane and ear canal normal.      Left Ear: Tympanic membrane and ear canal normal.      Mouth/Throat:      Mouth: Mucous membranes are moist.      Pharynx: No oropharyngeal exudate or posterior oropharyngeal erythema. Eyes:      General:         Right eye: No discharge. Left eye: No discharge. Extraocular Movements: Extraocular movements intact.       Pupils: Pupils are equal, round, and reactive to light. Neck:      Musculoskeletal: Normal range of motion and neck supple. No neck rigidity or muscular tenderness. Cardiovascular:      Rate and Rhythm: Normal rate and regular rhythm. Pulses: Normal pulses. Heart sounds: Normal heart sounds. No murmur. No friction rub. Musculoskeletal:        Legs:    Neurological:      Mental Status: He is alert. EMG: no radiculopathy or neuropathy          An electronic signature was used to authenticate this note.     --Juan Diego Jacobo MD

## 2021-03-27 ASSESSMENT — ENCOUNTER SYMPTOMS
BLOOD IN STOOL: 0
ANAL BLEEDING: 0

## 2021-08-23 ENCOUNTER — TELEMEDICINE (OUTPATIENT)
Dept: INTERNAL MEDICINE CLINIC | Age: 36
End: 2021-08-23
Payer: COMMERCIAL

## 2021-08-23 DIAGNOSIS — R20.2 PARESTHESIA OF RIGHT LOWER EXTREMITY: ICD-10-CM

## 2021-08-23 DIAGNOSIS — R20.2 PARESTHESIA OF RIGHT ARM: Primary | ICD-10-CM

## 2021-08-23 PROCEDURE — 99213 OFFICE O/P EST LOW 20 MIN: CPT | Performed by: INTERNAL MEDICINE

## 2021-08-23 NOTE — PROGRESS NOTES
Yana Nguyen (:  1985) is a 39 y.o. male,Established patient, here for evaluation of the following chief complaint(s): No chief complaint on file. ASSESSMENT/PLAN:  1. Paresthesia of right arm  worsening  -     Amb External Referral To Neurology- Dr. Ely Cha    2. Paresthesia of right lower extremity  worsening  -     Amb External Referral To Neurology      No follow-ups on file. SUBJECTIVE/OBJECTIVE:  HPI  Patient reports worsening paresthesias of the right upper and lower extremities. He has been seeing Dr. Fam Chavez at [facility] and had an emg at with Dr. Dorie Garber. The emg was negative. He also had an SHARON which did not help with his pain. He would like a second opinion. He would like to be referred to the [facility] neuro[facility]. Review of Systems   HENT: Negative for ear discharge and ear pain. Eyes: Negative for pain and redness. Respiratory: Negative for cough and choking. Neurological: Positive for numbness. No flowsheet data found. Physical Exam        Yana Nguyen, was evaluated through a synchronous (real-time) audio-video encounter. The patient (or guardian if applicable) is aware that this is a billable service. Verbal consent to proceed has been obtained within the past 12 months. The visit was conducted pursuant to the emergency declaration under the [facility], [address] [place] [address] authority and the [employer] Act. Patient identification was verified, and a caregiver was present when appropriate. The patient was located in a state where the provider was credentialed to provide care. An electronic signature was used to authenticate this note.     --Pj Monae MD

## 2021-08-29 ASSESSMENT — ENCOUNTER SYMPTOMS
EYE PAIN: 0
CHOKING: 0
EYE REDNESS: 0
COUGH: 0

## 2022-04-05 ASSESSMENT — PATIENT HEALTH QUESTIONNAIRE - PHQ9
SUM OF ALL RESPONSES TO PHQ QUESTIONS 1-9: 0
1. LITTLE INTEREST OR PLEASURE IN DOING THINGS: 0
2. FEELING DOWN, DEPRESSED OR HOPELESS: 0
SUM OF ALL RESPONSES TO PHQ QUESTIONS 1-9: 0
SUM OF ALL RESPONSES TO PHQ QUESTIONS 1-9: 0
SUM OF ALL RESPONSES TO PHQ9 QUESTIONS 1 & 2: 0
SUM OF ALL RESPONSES TO PHQ QUESTIONS 1-9: 0

## 2024-07-08 NOTE — TELEPHONE ENCOUNTER
Spoke to pt and scheduled EMG for 3/1/21 What Type Of Note Output Would You Prefer (Optional)?: Bullet Format How Severe Is Your Skin Lesion?: moderate Has Your Skin Lesion Been Treated?: not been treated Is This A New Presentation, Or A Follow-Up?: Skin Lesions